# Patient Record
Sex: FEMALE | Race: WHITE | Employment: OTHER | URBAN - METROPOLITAN AREA
[De-identification: names, ages, dates, MRNs, and addresses within clinical notes are randomized per-mention and may not be internally consistent; named-entity substitution may affect disease eponyms.]

---

## 2017-02-22 PROBLEM — Z85.828 PERSONAL HISTORY OF OTHER MALIGNANT NEOPLASM OF SKIN: Status: ACTIVE | Noted: 2017-02-22

## 2017-06-12 ENCOUNTER — HOSPITAL ENCOUNTER (OUTPATIENT)
Dept: MAMMOGRAPHY | Age: 82
Discharge: HOME | End: 2017-06-12
Attending: FAMILY MEDICINE
Payer: MEDICARE

## 2017-06-12 DIAGNOSIS — Z12.31 ENCOUNTER FOR SCREENING MAMMOGRAM FOR BREAST CANCER: ICD-10-CM

## 2017-06-12 PROCEDURE — 77067 SCR MAMMO BI INCL CAD: CPT | Performed by: FAMILY MEDICINE

## 2017-09-19 PROBLEM — Z85.828 PERSONAL HISTORY OF OTHER MALIGNANT NEOPLASM OF SKIN: Status: RESOLVED | Noted: 2017-02-22 | Resolved: 2017-09-19

## 2017-10-30 PROBLEM — H26.493 BILATERAL POSTERIOR CAPSULAR OPACIFICATION: Status: ACTIVE | Noted: 2017-10-30

## 2017-10-30 PROBLEM — Z96.1 PSEUDOPHAKIA OF BOTH EYES: Status: ACTIVE | Noted: 2017-10-30

## 2017-10-30 PROBLEM — H04.123 DRY EYE SYNDROME, BILATERAL: Status: ACTIVE | Noted: 2017-10-30

## 2018-05-29 ENCOUNTER — HOSPITAL ENCOUNTER (OUTPATIENT)
Dept: ULTRASOUND IMAGING | Age: 83
Discharge: HOME | End: 2018-05-29
Attending: INTERNAL MEDICINE
Payer: MEDICARE

## 2018-05-29 DIAGNOSIS — I25.10 CORONARY ARTERIOSCLEROSIS: ICD-10-CM

## 2018-05-29 DIAGNOSIS — R09.89 BRUIT: ICD-10-CM

## 2018-05-29 DIAGNOSIS — R93.1 ELEVATED CORONARY ARTERY CALCIUM SCORE: ICD-10-CM

## 2018-05-29 DIAGNOSIS — R09.89 OTH SYMPTOMS AND SIGNS INVOLVING THE CIRC AND RESP SYSTEMS: ICD-10-CM

## 2018-05-29 DIAGNOSIS — R09.89 BRUIT OF RIGHT CAROTID ARTERY: ICD-10-CM

## 2018-05-29 PROCEDURE — 93880 EXTRACRANIAL BILAT STUDY: CPT | Performed by: INTERNAL MEDICINE

## 2018-08-07 ENCOUNTER — HOSPITAL ENCOUNTER (OUTPATIENT)
Dept: MAMMOGRAPHY | Age: 83
Discharge: HOME | End: 2018-08-07
Attending: FAMILY MEDICINE
Payer: MEDICARE

## 2018-08-07 DIAGNOSIS — Z12.31 SCREENING MAMMOGRAM, ENCOUNTER FOR: ICD-10-CM

## 2018-08-07 PROCEDURE — 77067 SCR MAMMO BI INCL CAD: CPT | Performed by: FAMILY MEDICINE

## 2018-08-07 PROCEDURE — 77063 BREAST TOMOSYNTHESIS BI: CPT | Performed by: FAMILY MEDICINE

## 2019-05-04 ENCOUNTER — HOSPITAL ENCOUNTER (EMERGENCY)
Facility: HOSPITAL | Age: 84
Discharge: HOME | End: 2019-05-04
Attending: EMERGENCY MEDICINE
Payer: MEDICARE

## 2019-05-04 ENCOUNTER — APPOINTMENT (OUTPATIENT)
Dept: CT IMAGING | Facility: HOSPITAL | Age: 84
End: 2019-05-04
Attending: EMERGENCY MEDICINE
Payer: MEDICARE

## 2019-05-04 ENCOUNTER — APPOINTMENT (OUTPATIENT)
Dept: GENERAL RADIOLOGY | Facility: HOSPITAL | Age: 84
End: 2019-05-04
Attending: EMERGENCY MEDICINE
Payer: MEDICARE

## 2019-05-04 VITALS
RESPIRATION RATE: 16 BRPM | WEIGHT: 133 LBS | TEMPERATURE: 98 F | HEART RATE: 61 BPM | HEIGHT: 62 IN | SYSTOLIC BLOOD PRESSURE: 155 MMHG | DIASTOLIC BLOOD PRESSURE: 68 MMHG | BODY MASS INDEX: 24.48 KG/M2 | OXYGEN SATURATION: 95 %
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
## 2019-05-04 PROCEDURE — 99285 EMERGENCY DEPT VISIT HI MDM: CPT | Performed by: EMERGENCY MEDICINE

## 2019-05-04 PROCEDURE — 96360 HYDRATION IV INFUSION INIT: CPT | Performed by: EMERGENCY MEDICINE

## 2019-05-04 PROCEDURE — 73610 X-RAY EXAM OF ANKLE: CPT | Performed by: EMERGENCY MEDICINE

## 2019-05-04 PROCEDURE — 93005 ELECTROCARDIOGRAM TRACING: CPT

## 2019-05-04 PROCEDURE — 81003 URINALYSIS AUTO W/O SCOPE: CPT | Performed by: EMERGENCY MEDICINE

## 2019-05-04 PROCEDURE — 93010 ELECTROCARDIOGRAM REPORT: CPT | Performed by: EMERGENCY MEDICINE

## 2019-05-04 PROCEDURE — 80053 COMPREHEN METABOLIC PANEL: CPT | Performed by: EMERGENCY MEDICINE

## 2019-05-04 PROCEDURE — 84484 ASSAY OF TROPONIN QUANT: CPT | Performed by: EMERGENCY MEDICINE

## 2019-05-04 PROCEDURE — 70450 CT HEAD/BRAIN W/O DYE: CPT | Performed by: EMERGENCY MEDICINE

## 2019-05-04 PROCEDURE — 85025 COMPLETE CBC W/AUTO DIFF WBC: CPT | Performed by: EMERGENCY MEDICINE

## 2019-05-04 NOTE — ED NOTES
Remains alert and oriented,  Asymptomatic,  Family at bedside.    assisted to bathroom, pt unable to provide urine sample

## 2019-05-04 NOTE — ED PROVIDER NOTES
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

## 2019-05-19 PROBLEM — M25.571 ACUTE RIGHT ANKLE PAIN: Status: ACTIVE | Noted: 2019-05-19

## 2019-06-22 ENCOUNTER — HOSPITAL ENCOUNTER (INPATIENT)
Facility: HOSPITAL | Age: 84
LOS: 1 days | Discharge: HOME | DRG: 091 | End: 2019-06-24
Attending: EMERGENCY MEDICINE | Admitting: HOSPITALIST
Payer: MEDICARE

## 2019-06-22 ENCOUNTER — APPOINTMENT (OUTPATIENT)
Dept: CV DIAGNOSTICS | Facility: HOSPITAL | Age: 84
DRG: 091 | End: 2019-06-22
Attending: Other
Payer: MEDICARE

## 2019-06-22 ENCOUNTER — APPOINTMENT (OUTPATIENT)
Dept: MRI IMAGING | Facility: HOSPITAL | Age: 84
DRG: 091 | End: 2019-06-22
Attending: EMERGENCY MEDICINE
Payer: MEDICARE

## 2019-06-22 ENCOUNTER — APPOINTMENT (OUTPATIENT)
Dept: ULTRASOUND IMAGING | Facility: HOSPITAL | Age: 84
DRG: 091 | End: 2019-06-22
Attending: Other
Payer: MEDICARE

## 2019-06-22 DIAGNOSIS — R25.1 OCCASIONAL TREMORS: Primary | ICD-10-CM

## 2019-06-22 PROCEDURE — 82550 ASSAY OF CK (CPK): CPT | Performed by: HOSPITALIST

## 2019-06-22 PROCEDURE — 70553 MRI BRAIN STEM W/O & W/DYE: CPT | Performed by: EMERGENCY MEDICINE

## 2019-06-22 PROCEDURE — 85025 COMPLETE CBC W/AUTO DIFF WBC: CPT | Performed by: EMERGENCY MEDICINE

## 2019-06-22 PROCEDURE — 93880 EXTRACRANIAL BILAT STUDY: CPT | Performed by: OTHER

## 2019-06-22 PROCEDURE — 80053 COMPREHEN METABOLIC PANEL: CPT | Performed by: EMERGENCY MEDICINE

## 2019-06-22 PROCEDURE — 82962 GLUCOSE BLOOD TEST: CPT

## 2019-06-22 PROCEDURE — A9575 INJ GADOTERATE MEGLUMI 0.1ML: HCPCS | Performed by: EMERGENCY MEDICINE

## 2019-06-22 PROCEDURE — 85610 PROTHROMBIN TIME: CPT | Performed by: HOSPITALIST

## 2019-06-22 PROCEDURE — 84439 ASSAY OF FREE THYROXINE: CPT | Performed by: HOSPITALIST

## 2019-06-22 PROCEDURE — 81003 URINALYSIS AUTO W/O SCOPE: CPT | Performed by: EMERGENCY MEDICINE

## 2019-06-22 PROCEDURE — 93306 TTE W/DOPPLER COMPLETE: CPT | Performed by: OTHER

## 2019-06-22 PROCEDURE — 82607 VITAMIN B-12: CPT | Performed by: HOSPITALIST

## 2019-06-22 PROCEDURE — 93010 ELECTROCARDIOGRAM REPORT: CPT

## 2019-06-22 PROCEDURE — 93005 ELECTROCARDIOGRAM TRACING: CPT

## 2019-06-22 PROCEDURE — 99285 EMERGENCY DEPT VISIT HI MDM: CPT

## 2019-06-22 PROCEDURE — 84443 ASSAY THYROID STIM HORMONE: CPT | Performed by: HOSPITALIST

## 2019-06-22 PROCEDURE — 83036 HEMOGLOBIN GLYCOSYLATED A1C: CPT | Performed by: HOSPITALIST

## 2019-06-22 PROCEDURE — 96374 THER/PROPH/DIAG INJ IV PUSH: CPT

## 2019-06-22 PROCEDURE — 92610 EVALUATE SWALLOWING FUNCTION: CPT

## 2019-06-22 RX ORDER — METOCLOPRAMIDE HYDROCHLORIDE 5 MG/ML
5 INJECTION INTRAMUSCULAR; INTRAVENOUS EVERY 8 HOURS PRN
Status: DISCONTINUED | OUTPATIENT
Start: 2019-06-22 | End: 2019-06-24

## 2019-06-22 RX ORDER — METOPROLOL TARTRATE 50 MG/1
50 TABLET, FILM COATED ORAL
Status: DISCONTINUED | OUTPATIENT
Start: 2019-06-22 | End: 2019-06-24

## 2019-06-22 RX ORDER — GABAPENTIN 100 MG/1
100 CAPSULE ORAL NIGHTLY
Status: DISCONTINUED | OUTPATIENT
Start: 2019-06-22 | End: 2019-06-24

## 2019-06-22 RX ORDER — SODIUM CHLORIDE 9 MG/ML
INJECTION, SOLUTION INTRAVENOUS CONTINUOUS
Status: DISCONTINUED | OUTPATIENT
Start: 2019-06-22 | End: 2019-06-24

## 2019-06-22 RX ORDER — GABAPENTIN 100 MG/1
CAPSULE ORAL
Qty: 180 CAPSULE | Refills: 0 | Status: SHIPPED | OUTPATIENT
Start: 2019-06-22 | End: 2019-06-24

## 2019-06-22 RX ORDER — MORPHINE SULFATE 4 MG/ML
2 INJECTION, SOLUTION INTRAMUSCULAR; INTRAVENOUS EVERY 2 HOUR PRN
Status: DISCONTINUED | OUTPATIENT
Start: 2019-06-22 | End: 2019-06-24

## 2019-06-22 RX ORDER — ONDANSETRON 2 MG/ML
4 INJECTION INTRAMUSCULAR; INTRAVENOUS EVERY 6 HOURS PRN
Status: DISCONTINUED | OUTPATIENT
Start: 2019-06-22 | End: 2019-06-24

## 2019-06-22 RX ORDER — ATORVASTATIN CALCIUM 80 MG/1
80 TABLET, FILM COATED ORAL NIGHTLY
Status: DISCONTINUED | OUTPATIENT
Start: 2019-06-22 | End: 2019-06-24

## 2019-06-22 RX ORDER — ACETAMINOPHEN 650 MG/1
650 SUPPOSITORY RECTAL EVERY 4 HOURS PRN
Status: DISCONTINUED | OUTPATIENT
Start: 2019-06-22 | End: 2019-06-24

## 2019-06-22 RX ORDER — RAMIPRIL 5 MG/1
10 CAPSULE ORAL DAILY
Status: DISCONTINUED | OUTPATIENT
Start: 2019-06-23 | End: 2019-06-24

## 2019-06-22 RX ORDER — ACETAMINOPHEN 325 MG/1
650 TABLET ORAL EVERY 4 HOURS PRN
Status: DISCONTINUED | OUTPATIENT
Start: 2019-06-22 | End: 2019-06-24

## 2019-06-22 RX ORDER — SENNOSIDES 8.6 MG
17.2 TABLET ORAL NIGHTLY
Status: DISCONTINUED | OUTPATIENT
Start: 2019-06-22 | End: 2019-06-24

## 2019-06-22 RX ORDER — MORPHINE SULFATE 4 MG/ML
1 INJECTION, SOLUTION INTRAMUSCULAR; INTRAVENOUS EVERY 2 HOUR PRN
Status: DISCONTINUED | OUTPATIENT
Start: 2019-06-22 | End: 2019-06-24

## 2019-06-22 RX ORDER — ASPIRIN 325 MG
325 TABLET ORAL DAILY
Status: DISCONTINUED | OUTPATIENT
Start: 2019-06-22 | End: 2019-06-24

## 2019-06-22 RX ORDER — PHENYLEPHRINE HCL IN 0.9% NACL 50MG/250ML
PLASTIC BAG, INJECTION (ML) INTRAVENOUS CONTINUOUS PRN
Status: DISCONTINUED | OUTPATIENT
Start: 2019-06-22 | End: 2019-06-24

## 2019-06-22 RX ORDER — LABETALOL HYDROCHLORIDE 5 MG/ML
10 INJECTION, SOLUTION INTRAVENOUS EVERY 10 MIN PRN
Status: DISCONTINUED | OUTPATIENT
Start: 2019-06-22 | End: 2019-06-24

## 2019-06-22 RX ORDER — HYDROCHLOROTHIAZIDE 25 MG/1
6.25 TABLET ORAL DAILY
Status: DISCONTINUED | OUTPATIENT
Start: 2019-06-22 | End: 2019-06-24

## 2019-06-22 RX ORDER — ALPRAZOLAM 0.25 MG/1
0.12 TABLET ORAL 3 TIMES DAILY PRN
Status: DISCONTINUED | OUTPATIENT
Start: 2019-06-22 | End: 2019-06-24

## 2019-06-22 RX ORDER — ATORVASTATIN CALCIUM 20 MG/1
20 TABLET, FILM COATED ORAL NIGHTLY
Status: DISCONTINUED | OUTPATIENT
Start: 2019-06-22 | End: 2019-06-22

## 2019-06-22 RX ORDER — BISOPROLOL FUMARATE AND HYDROCHLOROTHIAZIDE 2.5; 6.25 MG/1; MG/1
1 TABLET ORAL
Status: DISCONTINUED | OUTPATIENT
Start: 2019-06-23 | End: 2019-06-22 | Stop reason: SDUPTHER

## 2019-06-22 RX ORDER — LORAZEPAM 2 MG/ML
0.5 INJECTION INTRAMUSCULAR ONCE
Status: COMPLETED | OUTPATIENT
Start: 2019-06-22 | End: 2019-06-22

## 2019-06-22 RX ORDER — GABAPENTIN 100 MG/1
100 CAPSULE ORAL 3 TIMES DAILY
Status: DISCONTINUED | OUTPATIENT
Start: 2019-06-22 | End: 2019-06-22

## 2019-06-22 NOTE — ED NOTES
Rounded on pt. Back from MRI. Updated on POC. Not in any distress. Resting on cart with family at bedside.

## 2019-06-22 NOTE — PROGRESS NOTES
[facility] Pharmacy Note:  Renal Dose Adjustment for Metoclopramide (REGLAN)    Salima Masterson has been prescribed Metoclopramide (REGLAN) 10 mg every 8 hours as needed for N/V. Estimated Creatinine Clearance: 24.7 mL/min (A) (based on SCr of 1.13 mg/dL (H)).

## 2019-06-22 NOTE — ED INITIAL ASSESSMENT (HPI)
Collapsing x2 last night. Hx previous fall causing chronic tremors to right leg. Tremors cause pts leg to give out and she collapses. Uses walker but still collapses when tremor starts. Has not hit head.  Has appt with neurologist in august

## 2019-06-22 NOTE — SLP NOTE
ADULT SWALLOWING EVALUATION    ASSESSMENT    ASSESSMENT/OVERALL IMPRESSION:  Brief Background Information: Per MD note, Corine Yoder is 79 y/o woman who was admitted due to frequent right leg tremor and falls.  Her daughter, son in law and  are on the bed outlined below and posted HOB; medication to be administered one pill at a time. This dept to follow closely to assess tolerance of recommended diet and assess cog-comm skills per stroke protocol.         RECOMMENDATIONS   Diet Recommendations - Solids: Re matter. Critical Findings were discussed with Dr. Macie Salinas 1140 hr on 6/22/2019 with read back. Dictated by: Cony Palma MD on 6/22/2019 at 11:35         SUBJECTIVE   Spouse of 66 years present for this evaluation.     OBJECTIVE   ORAL MOTOR EXAMINATIO

## 2019-06-22 NOTE — ED PROVIDER NOTES
Patient Seen in: [facility] Emergency Department    History   Patient presents with:  Fall (musculoskeletal, neurologic)    Stated Complaint: FALL    HPI    80-year-old female comes to the emergency department for evaluation after intermittent episo Francisco Fontana.  Ankur ([facility]); no adjuvant tx, right   •       x 2   • PERIPHERAL VASCULAR SCREENING HISTORICAL CONV  2012    PAD screen, mild carotid disease   • PERIPHERAL VASCULAR SCREENING HISTORICAL CONV Bilateral 10/31/2017    mild caroti including finger-nose and heel shin was normal.      ED Course     Labs Reviewed   COMP METABOLIC PANEL (14) - Abnormal; Notable for the following components:       Result Value    BUN 26 (*)     Creatinine 1.13 (*)     BUN/CREA Ratio 23.0 (*)     Calculat MD on 6/22/2019 at 11:35     Approved by: Alayna Carmichael MD            The patient remained neurologically intact and stable throughout the course the emergency department stay.   Case was discussed with the [facility] hospitalist as well as [facility] neurology Dr. Angelica Weller

## 2019-06-22 NOTE — H&P
[facility] hospitalist H+P  PCP Von Bacon MD  CC falls  HPI 79 yo female with multiple medical problems including but not limited to HTN, breast cancer, HL presents for evaluation of falls. Per pt she has been falling several time per day since may.  Per fami DENSITY AXIAL (CPT=77080)  11/14/2007    maurilio     Family History   Problem Relation Age of Onset   • Breast Cancer Sister 54   • Dementia Mother    • Other (Other) Mother         Dementia, arthritis   • Suicide History Father    • Depression Father    • Hobby Hazards: No        Sleep Concern: No        Stress Concern: No        Weight Concern: No        Special Diet: No        Back Care: No        Exercise: Yes          walk        Bike Helmet: No        Seat Belt: Yes        Self-Exams: Yes    Social His WBC 9.8   .0     Ua negative nitrite, negative leukocytes    BUN 26  Creatinine 1.13  Na 141    AST 19. ALT 19    MRI brain    Personally reviewed result in Epic  CONCLUSION:       1.  Possible tiny acute lacunar-type infarct involving the subcorti

## 2019-06-22 NOTE — PLAN OF CARE
BSE completed this date; diet recommended to be initiated; aspiration precautions in place and posted HOB; cognitive-comm eval pending per stroke protocol.

## 2019-06-22 NOTE — CONSULTS
Neurology consult NOTE    The reason for consultation:  Right leg tremor, intermittent and falls. HPI:   Zayra Goff is 79 y/o woman who was admitted due to frequent right leg tremor and falls. Her daughter, son in law and  are on the bed sides.    On • [address]  2008    Dr. Yanet Bolaños.  Ankur ([facility]); no adjuvant tx, right   •       x 2   • PERIPHERAL VASCULAR SCREENING HISTORICAL CONV  2012    PAD screen, mild carotid disease   • PERIPHERAL VASCULAR SCREEN Attends meetings of clubs or organizations: Not on file        Relationship status: Not on file      Intimate partner violence:        Fear of current or ex partner: Not on file        Emotionally abused: Not on file        Physically abused: Not on clyde Hearing: NL     Palate: NL     SCM/Trapezius: 5/5     Tongue in the middle    MOTOR FUNCTION:     Tone: NL     Bulk: NL     Strength: NL     Abnormal Movement: No tremor noticed     SENSORY FUNCTION:     Intact to lt   CEREBELLUM:     Upper Extremities: fi presented with a month hx of right leg episodic tremor, and falls. DDX including not limiting orthostatic tremor, simple partial seizure, and resting tremor. Her neuro exam showed no evidence of tremor.      She has incidental findings of right frontal lobe

## 2019-06-23 PROCEDURE — 97162 PT EVAL MOD COMPLEX 30 MIN: CPT

## 2019-06-23 PROCEDURE — 97530 THERAPEUTIC ACTIVITIES: CPT

## 2019-06-23 PROCEDURE — 82962 GLUCOSE BLOOD TEST: CPT

## 2019-06-23 PROCEDURE — 80061 LIPID PANEL: CPT | Performed by: HOSPITALIST

## 2019-06-23 PROCEDURE — 80048 BASIC METABOLIC PNL TOTAL CA: CPT | Performed by: HOSPITALIST

## 2019-06-23 PROCEDURE — 97116 GAIT TRAINING THERAPY: CPT

## 2019-06-23 PROCEDURE — 92526 ORAL FUNCTION THERAPY: CPT

## 2019-06-23 RX ORDER — HEPARIN SODIUM 5000 [USP'U]/ML
5000 INJECTION, SOLUTION INTRAVENOUS; SUBCUTANEOUS EVERY 12 HOURS SCHEDULED
Status: DISCONTINUED | OUTPATIENT
Start: 2019-06-23 | End: 2019-06-24

## 2019-06-23 RX ORDER — FAMOTIDINE 20 MG/1
20 TABLET ORAL DAILY
Status: DISCONTINUED | OUTPATIENT
Start: 2019-06-23 | End: 2019-06-24

## 2019-06-23 RX ORDER — FAMOTIDINE 10 MG/ML
20 INJECTION, SOLUTION INTRAVENOUS DAILY
Status: DISCONTINUED | OUTPATIENT
Start: 2019-06-23 | End: 2019-06-24

## 2019-06-23 NOTE — SLP NOTE
SPEECH DAILY NOTE - INPATIENT    ASSESSMENT & PLAN   ASSESSMENT  Pt seen for meal assess/dysphagia therapy to monitor po tolerance of recommended diet and ensure adherence to aspiration precautions. Pt alert and participatory.   Pt observed consuming lunch M.S.,[facility]-SLP  Speech Language Pathologist

## 2019-06-23 NOTE — PLAN OF CARE
Problem: Impaired Swallowing  Goal: Minimize aspiration risk  Description  Interventions:  - Patient should be alert and upright for all feedings (90 degrees preferred)  - Offer food and liquids at a slow rate  - Encourage small bites of food and small s

## 2019-06-23 NOTE — PLAN OF CARE
Assumed care at [facility]. Pt a/ox4. Neuro wnl. VSS. NSR/SB per tele. RA. Denies pain. IVF infusing per stroke protocol. Pt updated w/ POC. Echo and carotid US, results pending. Will continue to monitor.

## 2019-06-23 NOTE — PROGRESS NOTES
[facility] hospitalist daily note  Patient was seen/examined on 6/23/19    S: no headache, no chest pain, no SOB, no nausea/emesis, no dizziness  No numbness/tingling  Had echo    Medications in Epic    PE    06/23/19  1300   BP: 128/59   Pulse: 60   Resp: 18   T has LBBB  Per neuro start gabapentin  -echo with no regional wall motion abnormality  -evaluated by speech, consult PT     HTN monitor BP  Medications ordered    Abnormal EKG, LBBB, CVA; consult cardiology     Hyperlipidemia; statin     Hx of breast ca; fo

## 2019-06-23 NOTE — PROGRESS NOTES
06/23/19 1309   Clinical Encounter Type   Visited With Patient and family together   Routine Visit Introduction   Continue Visiting No   Restorationist Encounters   Restorationist Needs Prayer  ( prayed for the patient and the family's healyh and the fiut

## 2019-06-23 NOTE — PROGRESS NOTES
Neurology follow up NOTE    SUBJECTIVE:  She is upset by knowing she cannot go home due to her gait problems. Her family members were at bed side. She has no right leg tremor in past 24 hrs.      OBJECTIVE:   /53 (BP Location: Left arm)   Pulse 66   T LLE: 2+/4     PLANTAR RESPONSE: down going toes bilaterally. Mri Brain (w+wo) (cpt=70553)    Result Date: 6/22/2019  CONCLUSION:   1. Possible tiny acute lacunar-type infarct involving the subcortical white matter of the right frontal lobe.   2. Mode

## 2019-06-23 NOTE — PHYSICAL THERAPY NOTE
PHYSICAL THERAPY EVALUATION - INPATIENT     Room Number: 2451/9121-B  Evaluation Date: 6/23/2019  Type of Evaluation: Initial  Physician Order: PT Eval and Treat    Presenting Problem: falls, R LE tremors  Reason for Therapy: Mobility Dysfunction and microcalcifications and a left upper pole nodule measuring 2.9 cm.    • Other and unspecified hyperlipidemia 9/13/2011   • Skin cancer of nose 2003       Past Surgical History  Past Surgical History:   Procedure Laterality Date   • CATARACT  2008    right +  Static Standing: Fair -(used RW)  Dynamic Standing: Fair -(used RW)    ADDITIONAL TESTS                                    NEUROLOGICAL FINDINGS   WFL heel-to shin                   ACTIVITY TOLERANCE   BP sitting at EOB L arm: 135/47         BP: 126/45 which pt agreed to, pt was able to take steps to the recliner chair with the RW cga; pt was observed rushing to the chair & was also rushing to sit down, pt was given verbal cues to slow down. Pt is very fearful of falling & needed to be reassured.      Ex prior to level of function. DISCHARGE RECOMMENDATIONS  PT Discharge Recommendations: Sub-acute rehabilitation    PLAN  PT Treatment Plan: Bed mobility; Patient education; Family education;Gait training;Range of motion;Strengthening;Transfer training  Rehab

## 2019-06-23 NOTE — PROGRESS NOTES
Assumed pt care at 0730  Pt a & o x 4 vss on room air no complaints of pain   Neuro checks q4h  NIH 0   Cardiology consult Dr Jacy Martinez notified will see pt in the am 6/24  Per Dr. Marla boss to start the patient on subq heparin 5000 units BID   Orthostatic vit

## 2019-06-24 VITALS
WEIGHT: 125 LBS | HEART RATE: 72 BPM | RESPIRATION RATE: 24 BRPM | DIASTOLIC BLOOD PRESSURE: 63 MMHG | TEMPERATURE: 98 F | BODY MASS INDEX: 24.54 KG/M2 | SYSTOLIC BLOOD PRESSURE: 151 MMHG | OXYGEN SATURATION: 97 % | HEIGHT: 60 IN

## 2019-06-24 PROCEDURE — 95816 EEG AWAKE AND DROWSY: CPT

## 2019-06-24 PROCEDURE — 97165 OT EVAL LOW COMPLEX 30 MIN: CPT

## 2019-06-24 PROCEDURE — 82962 GLUCOSE BLOOD TEST: CPT

## 2019-06-24 PROCEDURE — 97530 THERAPEUTIC ACTIVITIES: CPT

## 2019-06-24 PROCEDURE — 97116 GAIT TRAINING THERAPY: CPT

## 2019-06-24 RX ORDER — ASPIRIN 325 MG
325 TABLET ORAL DAILY
Qty: 30 TABLET | Refills: 0 | Status: SHIPPED | OUTPATIENT
Start: 2019-06-25

## 2019-06-24 RX ORDER — GABAPENTIN 100 MG/1
CAPSULE ORAL
Qty: 180 CAPSULE | Refills: 0 | Status: SHIPPED | OUTPATIENT
Start: 2019-06-24 | End: 2019-08-02

## 2019-06-24 NOTE — PROCEDURES
Clinical History: 80year old female with right leg tremor     EEG DESCRIPTION    AWAKE  Background: 8 Hz; posterior head regions, symmetric, waxing and waning, reactive to eye opening and closure  Beta: 15-25 Hz; frontocentral, symmetric, waxing and wanin

## 2019-06-24 NOTE — DISCHARGE SUMMARY
General Medicine Discharge Summary     Patient ID:  Millicent Gomez  80year old  1/5/1931    Admit date: 6/22/2019    Discharge date and time:6/24/2019    Attending Physician: Obed Caceres MD CARDIOLOGY    Operative Procedures:        Patient instructions:      Current Discharge Medication List    START taking these medications    aspirin 325 MG Oral Tab  Take 1 tablet (325 mg total) by mouth daily.     gabapentin 100 MG Oral Cap  Gabapentin 100

## 2019-06-24 NOTE — CONSULTS
[facility] Cardiology Consultation    Davidfm Emely Patient Status:  Inpatient    1931 MRN [id number]   [facility] 7NE-A Attending Yony Nagel MD   Hosp Day # 1 PCP April Guerrero MD     Reason for Consultation:  LBBB, CAD      Hist nose tip   • SKIN SURGERY  2/8/17    Exc of BCC to left upper medial back with [facility]   • XR DEXA BONE DENSITY AXIAL (CPT=77080)  11/14/2007    maurilio     Family History   Problem Relation Age of Onset   • Breast Cancer Sister 54   • Dementia Mother    • Other °C)  Pulse: 59  Resp: 18  BP: 151/55      General:  Appears comfortable  HEENT: No focal deficits. Neck: No JVD, carotids 2+ no bruits. Cardiac: Regular S1S2. No S3, S4, rub, click. No murmur. Lungs: Clear to auscultation and percussion.   Abdomen: Sof that this reflects a cardiomyopathic process such as LV dysfunction. 3. Atherosclerotic heart disease, as evidenced by the presence of coronary calcium. Total score was 819 on a scan done October 2, 2017, currently asymptomatic.      4. Hypertension , w

## 2019-06-24 NOTE — PLAN OF CARE
Assumed care of patient at 07:30am.  Patient A/0 x4, A/O improved from last night and early this morning. Patient's VSS, NSR per tele, RA  PT/OT gave patient and  outpatient PT info. EEG completed and WNL. Stroke education completed.   Plan to pressure  - Maintain blood pressure and fluid volume within ordered parameters to optimize cerebral perfusion and minimize risk of hemorrhage  - Monitor temperature, glucose, and sodium.  Initiate appropriate interventions as ordered  - Educate patient to r

## 2019-06-24 NOTE — PHYSICAL THERAPY NOTE
PHYSICAL THERAPY TREATMENT NOTE - INPATIENT    Room Number: 6439/8473-H     Session: 1   Number of Visits to Meet Established Goals: 3    Presenting Problem: falls, R LE tremors     Per Neuro, differential diagnoses includes essential tremor, orthostatic forgetful    Patient’s self-stated goal is gom home    OBJECTIVE       WEIGHT BEARING RESTRICTION  Weight Bearing Restriction: None                PAIN ASSESSMENT   Ratin          BALANCE addressed; Family present    ASSESSMENT   Patient is a 80year old female admitted on 6/22/2019 for frequent falls, R LE tremors. Pertinent comorbidities and personal factors impacting therapy include h/o frequent falls. Pt progressing to supervision level f

## 2019-06-24 NOTE — PROGRESS NOTES
Subjective     No overnight events    • famoTIDine  20 mg Oral Daily    Or   • famoTIDine  20 mg Intravenous Daily   • Heparin Sodium (Porcine)  5,000 Units Subcutaneous 2 times per day   • aspirin  325 mg Oral Daily   • gabapentin  100 mg Oral Nightly   •

## 2019-06-24 NOTE — PROGRESS NOTES
NURSING DISCHARGE NOTE    Discharge instructions given and reviewed with patient, daughter and . All questions answered, verbalized understanding. Stroke education completed.  Stroke signs and symptoms reviewed,  Patient and  able to stat

## 2019-06-24 NOTE — PLAN OF CARE
Assumed care at [address]. Denies pain/discomfort. Stroke protocol. No deficits noted. Forgetful at times.   Plan: EEG 6/24      Problem: Impaired Swallowing  Goal: Minimize aspiration risk  Description  Interventions:  - Patient should be alert and upright

## 2019-06-25 NOTE — CM/SW NOTE
06/25/19 0900   Discharge disposition   Expected discharge disposition Home or Self   Name of [address] Provider Home   Outpatient services Physical therapy   Home services after discharge None   Dis

## 2019-08-13 ENCOUNTER — HOSPITAL ENCOUNTER (OUTPATIENT)
Dept: MAMMOGRAPHY | Age: 84
Discharge: HOME | End: 2019-08-13
Attending: FAMILY MEDICINE
Payer: MEDICARE

## 2019-08-13 DIAGNOSIS — Z12.31 ENCOUNTER FOR SCREENING MAMMOGRAM FOR BREAST CANCER: ICD-10-CM

## 2019-08-13 PROCEDURE — 77063 BREAST TOMOSYNTHESIS BI: CPT | Performed by: FAMILY MEDICINE

## 2019-08-13 PROCEDURE — 77067 SCR MAMMO BI INCL CAD: CPT | Performed by: FAMILY MEDICINE

## 2019-08-27 PROBLEM — M54.50 CHRONIC LOW BACK PAIN WITHOUT SCIATICA: Status: ACTIVE | Noted: 2019-08-27

## 2019-08-27 PROBLEM — G89.29 CHRONIC LOW BACK PAIN WITHOUT SCIATICA: Status: ACTIVE | Noted: 2019-08-27

## 2020-03-03 PROBLEM — N18.30 CKD (CHRONIC KIDNEY DISEASE) STAGE 3, GFR 30-59 ML/MIN (HCC): Status: ACTIVE | Noted: 2020-03-03

## 2020-08-18 ENCOUNTER — HOSPITAL ENCOUNTER (OUTPATIENT)
Dept: MAMMOGRAPHY | Age: 85
Discharge: HOME | End: 2020-08-18
Attending: FAMILY MEDICINE
Payer: MEDICARE

## 2020-08-18 DIAGNOSIS — Z85.3 HISTORY OF RIGHT BREAST CANCER: ICD-10-CM

## 2020-08-18 DIAGNOSIS — Z12.31 SCREENING MAMMOGRAM, ENCOUNTER FOR: ICD-10-CM

## 2020-08-18 PROCEDURE — 77063 BREAST TOMOSYNTHESIS BI: CPT | Performed by: FAMILY MEDICINE

## 2020-08-18 PROCEDURE — 77067 SCR MAMMO BI INCL CAD: CPT | Performed by: FAMILY MEDICINE

## 2021-01-07 PROBLEM — H61.23 IMPACTED CERUMEN, BILATERAL: Status: ACTIVE | Noted: 2021-01-07

## 2021-04-06 PROBLEM — F03.90 DEMENTIA WITHOUT BEHAVIORAL DISTURBANCE, UNSPECIFIED DEMENTIA TYPE: Status: ACTIVE | Noted: 2021-04-06

## 2021-04-06 PROBLEM — F03.90 DEMENTIA WITHOUT BEHAVIORAL DISTURBANCE, UNSPECIFIED DEMENTIA TYPE (HCC): Status: ACTIVE | Noted: 2021-04-06

## 2021-08-10 PROBLEM — L85.3 XEROSIS CUTIS: Status: ACTIVE | Noted: 2021-08-10

## 2021-08-10 PROBLEM — F03.90 DEMENTIA WITHOUT BEHAVIORAL DISTURBANCE, UNSPECIFIED DEMENTIA TYPE (HCC): Status: RESOLVED | Noted: 2021-04-06 | Resolved: 2021-08-10

## 2021-08-10 PROBLEM — M25.571 ACUTE RIGHT ANKLE PAIN: Status: RESOLVED | Noted: 2019-05-19 | Resolved: 2021-08-10

## 2021-08-10 PROBLEM — E78.00 PURE HYPERCHOLESTEROLEMIA: Status: ACTIVE | Noted: 2021-08-10

## 2021-08-10 PROBLEM — F03.90 DEMENTIA WITHOUT BEHAVIORAL DISTURBANCE, UNSPECIFIED DEMENTIA TYPE: Status: RESOLVED | Noted: 2021-04-06 | Resolved: 2021-08-10

## 2021-08-10 PROBLEM — R73.02 GLUCOSE INTOLERANCE (IMPAIRED GLUCOSE TOLERANCE): Status: ACTIVE | Noted: 2021-08-10

## 2021-08-10 PROBLEM — H61.23 IMPACTED CERUMEN, BILATERAL: Status: RESOLVED | Noted: 2021-01-07 | Resolved: 2021-08-10

## 2021-08-10 PROBLEM — F01.50 VASCULAR DEMENTIA WITHOUT BEHAVIORAL DISTURBANCE (HCC): Status: ACTIVE | Noted: 2021-08-10

## 2022-08-17 NOTE — ED NOTES
Patient is remained calm and cooperative throughout shift, awaiting transfer and morning. Endorsed to my colleague at shift change. Vital signs stable.

## 2022-08-17 NOTE — ED NOTES
Called [facility] to make referral for Pt. No answer. Left VM. Will try again later tonight if no call back.

## 2022-08-17 NOTE — ED NOTES
This writer contacted [address] [place] [address] regarding status of patients referral packet. No appropriate program for patient. No reason provided.

## 2022-08-17 NOTE — ED NOTES
This writer contacted patients Daughter, to provide an update on plan of care. This writer notified daughter that [facility] does have any beds. She was notified that [facility] has an available bed for patient. Patients daughter is agreeable to transfer out. This writer will follow up to confirm power of  healthcare paperwork vs. Living will is on file with our facility.

## 2022-08-17 NOTE — ED NOTES
This writer contacted patients daughter, who does not any other form than living will. Patients daughter cannot find any other paperwork than the living will at this point. Patients  is presently at a three hour infusion appointment and unavailable via phone at the time. Patients daughter will attempt to have a family member with him call with patient, once he is available. This writer notified Manuel; who will follow up with this writer. This writer spoke with Vero Gonzalez, who will have to follow up with this writer.

## 2022-08-17 NOTE — ED NOTES
This writer met with patients daughter regarding plan of care. She provided a copy of patients power of  for healthcare paperwork. She was informed that patient was accepted to [facility] and will be transferred today. Patients daughter is agreeable to transfer. Nursing and [facility] notified. A copy was scanned into patients chart.

## 2022-08-17 NOTE — ED NOTES
This writer spoke with Saym of [facility]. They confirmed receiving Living will in patients referral packet. She needs POA paperwork to move forward with acceptance. They will complete nurse to nurse for patient once this is received. This writer notified Shelly Pemberton.

## 2022-08-17 NOTE — ED NOTES
This writer spoke with patients . He is Power of  for patient. Patients father givens permission for this daughter to discuss plan of care. This writer notified patients  that [facility] has an available bed but is requesting POA paperwork. This writer notified patients  that living will was submitted on file. Patients  was notified that [employer] [facility] will need a copy of POA paperwork.

## 2022-08-17 NOTE — ED NOTES
Patient accepted to [facility].  Dr. Hallie Larios is accepting. Transportation will be set up during nurse to nurse.

## 2022-08-17 NOTE — ED NOTES
This writer contacted [address] regarding status of patients referral.  This writer spoke with [employer]. Patients packet is under review.

## 2022-08-17 NOTE — ED NOTES
TRANSFER SUMMARY:  [facility] Maldonado: Faxed packet for review. NO AVAILABLE BEDS:  [facility]: No available beds with no scheduled discharges today. [facility] CARLOS: No beds available d/t patient requiring a 1:1.     PATIENT DEFLECTED/NO ANSWER:  [facility]:  Patient deflected d/t no appropriate program.  OUT OF NETWORK:   Betty Millan

## 2022-08-17 NOTE — ED NOTES
This writer contacted patients daughter, Maryellen Miner, who is power of  for patient. This writer left a voicemail message for a return phone call.

## 2022-08-17 NOTE — ED NOTES
This worker was informed by [employer] that [employer] is OON with [facility] so she will need to be transferred out. This writer called Pt's daughter/POA, Jacques Ozuna, and explained the need for transfer out and offered a list of hospitals we can make referrals to. Jacques Ozuna prefers Pt only be referred to [facility] for tonight and will look into other hospitals tomorrow if Pt is not placed tonight.

## 2022-08-17 NOTE — ED NOTES
Spoke with Feliberto Colunga from Intake Department at [facility] who was unable to take referral tonight, recommended calling back in AM to make referral.

## 2022-08-17 NOTE — ED NOTES
This writer received an incoming phone call from patients daughter. She will bring in patients Power of  paperwork to [facility].    This writer notified [employer].

## 2022-08-17 NOTE — ED NOTES
Patient taken over from previous physician pending mental health transfer location to be determined.

## 2022-10-04 ENCOUNTER — APPOINTMENT (OUTPATIENT)
Dept: CV DIAGNOSTICS | Facility: HOSPITAL | Age: 87
End: 2022-10-04
Attending: INTERNAL MEDICINE
Payer: MEDICARE

## 2022-10-04 ENCOUNTER — APPOINTMENT (OUTPATIENT)
Dept: CT IMAGING | Facility: HOSPITAL | Age: 87
End: 2022-10-04
Attending: EMERGENCY MEDICINE
Payer: MEDICARE

## 2022-10-04 ENCOUNTER — APPOINTMENT (OUTPATIENT)
Dept: GENERAL RADIOLOGY | Facility: HOSPITAL | Age: 87
End: 2022-10-04
Attending: EMERGENCY MEDICINE
Payer: MEDICARE

## 2022-10-04 ENCOUNTER — HOSPITAL ENCOUNTER (INPATIENT)
Facility: HOSPITAL | Age: 87
LOS: 2 days | Discharge: NURSING, REHAB OR LONG-TERM CARE | End: 2022-10-06
Attending: EMERGENCY MEDICINE | Admitting: INTERNAL MEDICINE
Payer: MEDICARE

## 2022-10-04 DIAGNOSIS — I26.09 OTHER ACUTE PULMONARY EMBOLISM WITH ACUTE COR PULMONALE (HCC): Primary | ICD-10-CM

## 2022-10-04 DIAGNOSIS — J96.01 ACUTE RESPIRATORY FAILURE WITH HYPOXIA (HCC): ICD-10-CM

## 2022-10-04 DIAGNOSIS — R77.8 ELEVATED TROPONIN: ICD-10-CM

## 2022-10-04 PROBLEM — I26.99 PULMONARY EMBOLUS (HCC): Status: ACTIVE | Noted: 2022-10-04

## 2022-10-04 LAB
ALBUMIN SERPL-MCNC: 2.5 G/DL (ref 3.4–5)
ALBUMIN/GLOB SERPL: 0.6 {RATIO} (ref 1–2)
ALP LIVER SERPL-CCNC: 155 U/L
ALT SERPL-CCNC: 22 U/L
ANION GAP SERPL CALC-SCNC: 9 MMOL/L (ref 0–18)
APTT PPP: 112.2 SECONDS (ref 23.3–35.6)
APTT PPP: 27.1 SECONDS (ref 23.3–35.6)
AST SERPL-CCNC: 23 U/L (ref 15–37)
BASOPHILS # BLD AUTO: 0.02 X10(3) UL (ref 0–0.2)
BASOPHILS NFR BLD AUTO: 0.1 %
BILIRUB SERPL-MCNC: 0.4 MG/DL (ref 0.1–2)
BILIRUB UR QL STRIP.AUTO: NEGATIVE
BUN BLD-MCNC: 16 MG/DL (ref 7–18)
CALCIUM BLD-MCNC: 9.2 MG/DL (ref 8.5–10.1)
CHLORIDE SERPL-SCNC: 104 MMOL/L (ref 98–112)
CHOLEST SERPL-MCNC: 232 MG/DL (ref ?–200)
CLARITY UR REFRACT.AUTO: CLEAR
CO2 SERPL-SCNC: 23 MMOL/L (ref 21–32)
COLOR UR AUTO: COLORLESS
CREAT BLD-MCNC: 1.02 MG/DL
D DIMER PPP FEU-MCNC: 4.78 UG/ML FEU (ref ?–0.91)
EOSINOPHIL # BLD AUTO: 0.02 X10(3) UL (ref 0–0.7)
EOSINOPHIL NFR BLD AUTO: 0.1 %
ERYTHROCYTE [DISTWIDTH] IN BLOOD BY AUTOMATED COUNT: 14.6 %
GFR SERPLBLD BASED ON 1.73 SQ M-ARVRAT: 52 ML/MIN/1.73M2 (ref 60–?)
GLOBULIN PLAS-MCNC: 4.4 G/DL (ref 2.8–4.4)
GLUCOSE BLD-MCNC: 153 MG/DL (ref 70–99)
GLUCOSE UR STRIP.AUTO-MCNC: NEGATIVE MG/DL
HCT VFR BLD AUTO: 40.9 %
HDLC SERPL-MCNC: 43 MG/DL (ref 40–59)
HGB BLD-MCNC: 12.9 G/DL
IMM GRANULOCYTES # BLD AUTO: 0.06 X10(3) UL (ref 0–1)
IMM GRANULOCYTES NFR BLD: 0.4 %
INR BLD: 1.12 (ref 0.85–1.16)
KETONES UR STRIP.AUTO-MCNC: NEGATIVE MG/DL
LDLC SERPL CALC-MCNC: 157 MG/DL (ref ?–100)
LEUKOCYTE ESTERASE UR QL STRIP.AUTO: NEGATIVE
LYMPHOCYTES # BLD AUTO: 0.71 X10(3) UL (ref 1–4)
LYMPHOCYTES NFR BLD AUTO: 4.8 %
MCH RBC QN AUTO: 28.9 PG (ref 26–34)
MCHC RBC AUTO-ENTMCNC: 31.5 G/DL (ref 31–37)
MCV RBC AUTO: 91.7 FL
MONOCYTES # BLD AUTO: 0.95 X10(3) UL (ref 0.1–1)
MONOCYTES NFR BLD AUTO: 6.4 %
NEUTROPHILS # BLD AUTO: 13 X10 (3) UL (ref 1.5–7.7)
NEUTROPHILS # BLD AUTO: 13 X10(3) UL (ref 1.5–7.7)
NEUTROPHILS NFR BLD AUTO: 88.2 %
NITRITE UR QL STRIP.AUTO: NEGATIVE
NONHDLC SERPL-MCNC: 189 MG/DL (ref ?–130)
NT-PROBNP SERPL-MCNC: 4017 PG/ML (ref ?–450)
OSMOLALITY SERPL CALC.SUM OF ELEC: 286 MOSM/KG (ref 275–295)
PH UR STRIP.AUTO: 6 [PH] (ref 5–8)
PLATELET # BLD AUTO: 360 10(3)UL (ref 150–450)
POTASSIUM SERPL-SCNC: 4.7 MMOL/L (ref 3.5–5.1)
PROCALCITONIN SERPL-MCNC: 0.08 NG/ML (ref ?–0.16)
PROT SERPL-MCNC: 6.9 G/DL (ref 6.4–8.2)
PROT UR STRIP.AUTO-MCNC: NEGATIVE MG/DL
PROTHROMBIN TIME: 14.4 SECONDS (ref 11.6–14.8)
RBC # BLD AUTO: 4.46 X10(6)UL
RBC UR QL AUTO: NEGATIVE
SARS-COV-2 RNA RESP QL NAA+PROBE: NOT DETECTED
SODIUM SERPL-SCNC: 136 MMOL/L (ref 136–145)
SP GR UR STRIP.AUTO: 1.02 (ref 1–1.03)
TRIGL SERPL-MCNC: 177 MG/DL (ref 30–149)
TROPONIN I HIGH SENSITIVITY: 390 NG/L
TROPONIN I HIGH SENSITIVITY: 577 NG/L
UROBILINOGEN UR STRIP.AUTO-MCNC: <2 MG/DL
VLDLC SERPL CALC-MCNC: 34 MG/DL (ref 0–30)
WBC # BLD AUTO: 14.8 X10(3) UL (ref 4–11)

## 2022-10-04 PROCEDURE — 99285 EMERGENCY DEPT VISIT HI MDM: CPT

## 2022-10-04 PROCEDURE — 80061 LIPID PANEL: CPT | Performed by: EMERGENCY MEDICINE

## 2022-10-04 PROCEDURE — 85730 THROMBOPLASTIN TIME PARTIAL: CPT | Performed by: EMERGENCY MEDICINE

## 2022-10-04 PROCEDURE — 83880 ASSAY OF NATRIURETIC PEPTIDE: CPT | Performed by: EMERGENCY MEDICINE

## 2022-10-04 PROCEDURE — 84484 ASSAY OF TROPONIN QUANT: CPT | Performed by: EMERGENCY MEDICINE

## 2022-10-04 PROCEDURE — 80053 COMPREHEN METABOLIC PANEL: CPT | Performed by: EMERGENCY MEDICINE

## 2022-10-04 PROCEDURE — 93306 TTE W/DOPPLER COMPLETE: CPT | Performed by: INTERNAL MEDICINE

## 2022-10-04 PROCEDURE — 71275 CT ANGIOGRAPHY CHEST: CPT | Performed by: EMERGENCY MEDICINE

## 2022-10-04 PROCEDURE — 71045 X-RAY EXAM CHEST 1 VIEW: CPT | Performed by: EMERGENCY MEDICINE

## 2022-10-04 PROCEDURE — 85379 FIBRIN DEGRADATION QUANT: CPT | Performed by: EMERGENCY MEDICINE

## 2022-10-04 PROCEDURE — 81003 URINALYSIS AUTO W/O SCOPE: CPT | Performed by: EMERGENCY MEDICINE

## 2022-10-04 PROCEDURE — 96375 TX/PRO/DX INJ NEW DRUG ADDON: CPT

## 2022-10-04 PROCEDURE — 84145 PROCALCITONIN (PCT): CPT | Performed by: INTERNAL MEDICINE

## 2022-10-04 PROCEDURE — 93010 ELECTROCARDIOGRAM REPORT: CPT

## 2022-10-04 PROCEDURE — 85610 PROTHROMBIN TIME: CPT | Performed by: EMERGENCY MEDICINE

## 2022-10-04 PROCEDURE — 85025 COMPLETE CBC W/AUTO DIFF WBC: CPT | Performed by: EMERGENCY MEDICINE

## 2022-10-04 PROCEDURE — 96365 THER/PROPH/DIAG IV INF INIT: CPT

## 2022-10-04 RX ORDER — MEMANTINE HYDROCHLORIDE 5 MG/1
10 TABLET ORAL 2 TIMES DAILY
Status: DISCONTINUED | OUTPATIENT
Start: 2022-10-04 | End: 2022-10-06

## 2022-10-04 RX ORDER — DIAPER,BRIEF,INFANT-TODD,DISP
EACH MISCELLANEOUS DAILY
COMMUNITY

## 2022-10-04 RX ORDER — FUROSEMIDE 10 MG/ML
40 INJECTION INTRAMUSCULAR; INTRAVENOUS ONCE
Status: COMPLETED | OUTPATIENT
Start: 2022-10-04 | End: 2022-10-04

## 2022-10-04 RX ORDER — HEPARIN SODIUM 1000 [USP'U]/ML
80 INJECTION, SOLUTION INTRAVENOUS; SUBCUTANEOUS ONCE
Status: COMPLETED | OUTPATIENT
Start: 2022-10-04 | End: 2022-10-04

## 2022-10-04 RX ORDER — NYSTATIN 100000 [USP'U]/G
POWDER TOPICAL 3 TIMES DAILY
COMMUNITY
Start: 2022-09-24

## 2022-10-04 RX ORDER — ONDANSETRON 2 MG/ML
4 INJECTION INTRAMUSCULAR; INTRAVENOUS EVERY 6 HOURS PRN
Status: DISCONTINUED | OUTPATIENT
Start: 2022-10-04 | End: 2022-10-06

## 2022-10-04 RX ORDER — MEMANTINE HYDROCHLORIDE 10 MG/1
10 TABLET ORAL 2 TIMES DAILY
COMMUNITY

## 2022-10-04 RX ORDER — HEPARIN SODIUM AND DEXTROSE 10000; 5 [USP'U]/100ML; G/100ML
INJECTION INTRAVENOUS CONTINUOUS
Status: DISCONTINUED | OUTPATIENT
Start: 2022-10-04 | End: 2022-10-05

## 2022-10-04 RX ORDER — HEPARIN SODIUM AND DEXTROSE 10000; 5 [USP'U]/100ML; G/100ML
18 INJECTION INTRAVENOUS ONCE
Status: COMPLETED | OUTPATIENT
Start: 2022-10-04 | End: 2022-10-04

## 2022-10-04 RX ORDER — ACETAMINOPHEN 325 MG/1
650 TABLET ORAL EVERY 6 HOURS PRN
Status: DISCONTINUED | OUTPATIENT
Start: 2022-10-04 | End: 2022-10-06

## 2022-10-04 NOTE — ED QUICK NOTES
Orders for admission, patient is aware of plan and ready to go upstairs.  Any questions, please call ED RN Niko Escobedo at extension [phone]    Patient Covid vaccination status: Fully vaccinated     COVID Test Ordered in ED: Rapid SARS-CoV-2 by PCR    COVID Suspicion at Admission: No risk with negative rapid    Running Infusions:  heparin 1200units/hr    Mental Status/LOC at time of transport: a&ox1    Other pertinent information:   CIWA score: N/A   NIH score:  N/A

## 2022-10-04 NOTE — ED INITIAL ASSESSMENT (HPI)
PT PRESENTS TO ED WITH SHORTNESS OF BREATH, HYPOXIC AT NURSING HOME IN 80'S ON RA, DOES NOT NORMALLY WEAR O2 AT NURSING FACILITY, PT IS A&OX1 AND THAT IS HER BASELINE.   PT IS 88% ON RA UPON ARRIVAL

## 2022-10-05 LAB
ANION GAP SERPL CALC-SCNC: 7 MMOL/L (ref 0–18)
APTT PPP: 85.6 SECONDS (ref 23.3–35.6)
BASOPHILS # BLD AUTO: 0.05 X10(3) UL (ref 0–0.2)
BASOPHILS NFR BLD AUTO: 0.5 %
BUN BLD-MCNC: 19 MG/DL (ref 7–18)
CALCIUM BLD-MCNC: 8.8 MG/DL (ref 8.5–10.1)
CHLORIDE SERPL-SCNC: 103 MMOL/L (ref 98–112)
CO2 SERPL-SCNC: 28 MMOL/L (ref 21–32)
CREAT BLD-MCNC: 0.95 MG/DL
EOSINOPHIL # BLD AUTO: 0.23 X10(3) UL (ref 0–0.7)
EOSINOPHIL NFR BLD AUTO: 2.5 %
ERYTHROCYTE [DISTWIDTH] IN BLOOD BY AUTOMATED COUNT: 14.6 %
GFR SERPLBLD BASED ON 1.73 SQ M-ARVRAT: 57 ML/MIN/1.73M2 (ref 60–?)
GLUCOSE BLD-MCNC: 106 MG/DL (ref 70–99)
HCT VFR BLD AUTO: 36.5 %
HGB BLD-MCNC: 11.7 G/DL
IMM GRANULOCYTES # BLD AUTO: 0.03 X10(3) UL (ref 0–1)
IMM GRANULOCYTES NFR BLD: 0.3 %
LYMPHOCYTES # BLD AUTO: 1.52 X10(3) UL (ref 1–4)
LYMPHOCYTES NFR BLD AUTO: 16.3 %
MCH RBC QN AUTO: 28.9 PG (ref 26–34)
MCHC RBC AUTO-ENTMCNC: 32.1 G/DL (ref 31–37)
MCV RBC AUTO: 90.1 FL
MONOCYTES # BLD AUTO: 1.13 X10(3) UL (ref 0.1–1)
MONOCYTES NFR BLD AUTO: 12.2 %
NEUTROPHILS # BLD AUTO: 6.34 X10 (3) UL (ref 1.5–7.7)
NEUTROPHILS # BLD AUTO: 6.34 X10(3) UL (ref 1.5–7.7)
NEUTROPHILS NFR BLD AUTO: 68.2 %
OSMOLALITY SERPL CALC.SUM OF ELEC: 289 MOSM/KG (ref 275–295)
PLATELET # BLD AUTO: 304 10(3)UL (ref 150–450)
POTASSIUM SERPL-SCNC: 4.1 MMOL/L (ref 3.5–5.1)
RBC # BLD AUTO: 4.05 X10(6)UL
SODIUM SERPL-SCNC: 138 MMOL/L (ref 136–145)
WBC # BLD AUTO: 9.3 X10(3) UL (ref 4–11)

## 2022-10-05 PROCEDURE — 85730 THROMBOPLASTIN TIME PARTIAL: CPT | Performed by: INTERNAL MEDICINE

## 2022-10-05 PROCEDURE — 85025 COMPLETE CBC W/AUTO DIFF WBC: CPT | Performed by: INTERNAL MEDICINE

## 2022-10-05 PROCEDURE — 80048 BASIC METABOLIC PNL TOTAL CA: CPT | Performed by: INTERNAL MEDICINE

## 2022-10-05 RX ORDER — ALPRAZOLAM 0.25 MG/1
0.12 TABLET ORAL 3 TIMES DAILY PRN
Status: ON HOLD | COMMUNITY
End: 2022-10-06

## 2022-10-05 RX ORDER — ASPIRIN 325 MG
325 TABLET ORAL DAILY
COMMUNITY
End: 2022-10-06

## 2022-10-05 RX ORDER — B-COMPLEX WITH VITAMIN C
TABLET ORAL
COMMUNITY

## 2022-10-05 RX ORDER — IBUPROFEN 200 MG
200 TABLET ORAL EVERY 8 HOURS PRN
COMMUNITY
End: 2022-10-06

## 2022-10-05 RX ORDER — ATORVASTATIN CALCIUM 20 MG/1
20 TABLET, FILM COATED ORAL NIGHTLY
COMMUNITY

## 2022-10-05 RX ORDER — MULTIVIT-MIN/IRON FUM/FOLIC AC 7.5 MG-4
1 TABLET ORAL DAILY
COMMUNITY

## 2022-10-05 RX ORDER — ALPRAZOLAM 0.25 MG/1
0.12 TABLET ORAL 3 TIMES DAILY PRN
Status: DISCONTINUED | OUTPATIENT
Start: 2022-10-05 | End: 2022-10-06

## 2022-10-05 RX ORDER — BENZOCAINE 20 %
1 GEL (GRAM) MUCOUS MEMBRANE DAILY
COMMUNITY

## 2022-10-05 RX ORDER — RAMIPRIL 10 MG/1
10 CAPSULE ORAL DAILY
COMMUNITY

## 2022-10-05 RX ORDER — BISOPROLOL FUMARATE AND HYDROCHLOROTHIAZIDE 2.5; 6.25 MG/1; MG/1
1 TABLET ORAL DAILY
COMMUNITY

## 2022-10-05 RX ORDER — ZALEPLON 5 MG/1
5 CAPSULE ORAL NIGHTLY
COMMUNITY

## 2022-10-05 RX ORDER — ATORVASTATIN CALCIUM 20 MG/1
20 TABLET, FILM COATED ORAL NIGHTLY
Status: DISCONTINUED | OUTPATIENT
Start: 2022-10-05 | End: 2022-10-06

## 2022-10-05 NOTE — PLAN OF CARE
Pt more alert today, opening eyes to speech and speaking with family members. Still oriented x1, which is baseline. VSS. NSR on tele. Able to get down to 1L O2 today. Heprin drip discontinue and oral blood thinners started per cardiology. Patient took oral meds well today when mixed with apple sauce. Bed bound at baseline. Skin intact. Q2 turns. Incontinent, purwick in place. Spoke with  who confirms that he and his daughter Davion Sanders and both [place] for patient. Daughter provided a list of medications that were not being given at facility but patient was taking prior to that admission. These meds were added to admission PTA med list and hospitalist notified for review. Bed in lowest position, call light in reach, family and patient updated on plan of care, all questions answered at this time.    Problem: Patient/Family Goals  Goal: Patient/Family Long Term Goal  Description: Patient's Long Term Goal: \"stay out of the hospital\"    Interventions:  - Medications as prescribed  - See additional Care Plan goals for specific interventions  Outcome: Progressing  Goal: Patient/Family Short Term Goal  Description: Patient's Short Term Goal: \"Go back home (to facility)\"    Interventions:   - wean O2 needs  - Cardiology to dose DOAC medications  - See additional Care Plan goals for specific interventions  Outcome: Progressing

## 2022-10-05 NOTE — PLAN OF CARE
Assumed care for pt at 19:30 on 10/4    Nonverbal, resists being cleaned/turned. Lethargic, not following commands to open eyes. Did not give anything PO. Appears comfortable, sleeping. VSS on 2L. NSR with BBB on tele. No LE edema, +2 pedal pulses. Incontinent, purewick in place. Heparin infusing through L AC for PE protocol. Called by lab at 02:30 to confirm 2AM PTT needed- said she was at a rapid response and behind. Total care pt, turn q 2h. Wound care consulted for RLE wound. Edit: lab here attempted to draw pt, pt non cooperative and moving. R Arm precaution limits area to draw blood from. Attempting again when AM lab people are present. Plan: Heparin gtt, wound to see. Bed alarm on, call light in reach. Rounding q 2h.

## 2022-10-05 NOTE — CM/SW NOTE
Chart reviewed for discharge needs. Pt came to hospital from [address] Initial updates sent in A[place].      MALA Anderson, [facility]  Discharge [address]

## 2022-10-05 NOTE — PROGRESS NOTES
NURSING ADMISSION NOTE      Patient admitted via Cart  Oriented to room. Safety precautions initiated. Bed in low position. Call light in reach. Pt arrived to floor lethargic, oriented to self. 3L O2 via nasal canula. Bubbler added. NSR on tele. Vitals stable. Hep gtt infusing. Unable to stand for weight or orthostatic BP. Nonverbal - nods appropriately. Skin check complete with tech, skin clean, dry, intact, one abrasion noted to the right shin. Covered with mepilex. Family updated on plan of care, all questions answered.

## 2022-10-06 VITALS
DIASTOLIC BLOOD PRESSURE: 57 MMHG | HEART RATE: 89 BPM | OXYGEN SATURATION: 92 % | BODY MASS INDEX: 26.5 KG/M2 | TEMPERATURE: 97 F | SYSTOLIC BLOOD PRESSURE: 125 MMHG | WEIGHT: 144 LBS | RESPIRATION RATE: 16 BRPM | HEIGHT: 62 IN

## 2022-10-06 RX ORDER — ACETAMINOPHEN 325 MG/1
650 TABLET ORAL EVERY 6 HOURS PRN
Qty: 30 TABLET | Refills: 0 | Status: SHIPPED | COMMUNITY
Start: 2022-10-06

## 2022-10-06 RX ORDER — ALPRAZOLAM 0.25 MG/1
0.12 TABLET ORAL 3 TIMES DAILY PRN
Qty: 12 TABLET | Refills: 0 | Status: SHIPPED | OUTPATIENT
Start: 2022-10-06

## 2022-10-06 NOTE — CM/SW NOTE
10/06/22 1225   Discharge disposition   Expected discharge disposition [facility] Provider [facility]   Discharge transportation [facility] with RN. Pt cleared for discharge to return to [facility] today. Confirmed w/ Nicole at [facility] that pt can return today. RN updated. BLS set up for 5:00pm. PCS form completed/printed. RN to update pt/family.     Keren Mares [phone]      [facility]: [phone]

## 2022-10-06 NOTE — PLAN OF CARE
Pt denies c/o pain. A&Ox1, oriented to person only. Lungs diminished bilaterally with equal expansion, on 3L O2 NC. Pt NSR on monitor with regular rate. Abdomen soft and non-tender with active bowel sounds. Incontinent of B&B. Wound to RLE, steri strips and optifoam. Pt and family member updated with plan of care.     Problem: Patient/Family Goals  Goal: Patient/Family Long Term Goal  Description: Patient's Long Term Goal: \"stay out of the hospital\"    Interventions:  - Medications as prescribed  - See additional Care Plan goals for specific interventions  Outcome: Progressing  Goal: Patient/Family Short Term Goal  Description: Patient's Short Term Goal: \"Go back home (to facility)\"    Interventions:   - wean O2 needs  - Cardiology to dose DOAC medications  - See additional Care Plan goals for specific interventions  Outcome: Progressing

## 2022-10-06 NOTE — PLAN OF CARE
Assumed care of patient at 1. Pt lethargic, opened eyes and shook head no when prompted with questions. Pt did not talk, KAITLIN orientation. O2 sats maintained on 3L NC. NSR on tele. Last BM 10/3. Connee Gehrig in place for incontinence. Pt shakes her head no when asked about pain. Total assist. Pt updated on plan of care. Care needs met. Bed in lowest position, Call light within reach. Bed alarm on. Pt refusing most of her meds, shaking her head no. Able to have pt swallow 1-2 bites of applesauce with some meds. Pt started taking meds out of her mouth and putting them on blanket. Right shin abrasion with mepilex in place. This morning, pt speaking minimal words, more alert.       POC: wound care to see    Problem: Patient/Family Goals  Goal: Patient/Family Long Term Goal  Description: Patient's Long Term Goal: \"stay out of the hospital\"    Interventions:  - Medications as prescribed  - See additional Care Plan goals for specific interventions  Outcome: Progressing  Goal: Patient/Family Short Term Goal  Description: Patient's Short Term Goal: \"Go back home (to facility)\"    Interventions:   - wean O2 needs  - Cardiology to dose DOAC medications  - See additional Care Plan goals for specific interventions  Outcome: Progressing

## 2022-10-06 NOTE — PLAN OF CARE
This RN called report to [facility] in [facility]. This RN on hold for approx five minutes when  staff asked to take message. This RN gave from desk staff assigned phone number for call back.

## (undated) NOTE — IP AVS SNAPSHOT
[address]            (For Outpatient Use Only) Initial Admit Date: 10/4/2022   Inpt/Obs Admit Date: Inpt: 10/4/22 / Obs: N/A   Discharge Date:    Hospital Acct:  [de-identified]   MRN: [de-identified]   CSN: [id number]   CEID: [id number]        ENCOUNTER  Patient Class: Inpatient Admitting Provider: Hugo Mata DO Unit: [address] [facility] Service: Cardiac Telemetry Attending Provider: Hugo Mata DO   Bed: 1602-A   Visit Type:   Referring Physician: No ref. provider found Billing Flag:    Admit Diagnosis: Elevated troponin [R77.8]      PATIENT  Legal Name:   Pernell Olivas   Legal Sex: Female  Gender ID:              [address] [facility] Name:    PCP:  Husam Menard MD Home: [phone]   Address:  [address] [place] [address] : 1931 (89 yrs) Mobile: [phone]         City/State/Zip: [address], [address] Marital:  Language: [address] [place] [address] Drive: Will SSN4: [id number] Jehovah's witness: Religion - [employer] C*     Race: White Ethnicity: Non  Or  O*   EMERGENCY CONTACT   Name Relationship Legal Guardian? Home Phone Work Phone Mobile Phone   1. Yaneli Qureshi  2.  Nichelle Decent Daughter  Spouse    [phone]     GUARANTOR  Guarantor: Wilberto Chaudhry : 1931 Home Phone: [phone]   Address: [address] [place] [address]  Sex: Female Work Phone:    City/State/Zip: [address] [place] [address], [address]   Rel. to Patient: Self Guarantor ID: [id number]   [address]   Employer:  Status: RETIRED     COVERAGE  PRIMARY INSURANCE   Payor: [facility]*   Group Number: [id number] Insurance Type: INDEMNITY   Subscriber Name: Duane Camejo : 1931   Subscriber ID: [id number] Pt Rel to Subscriber: Self   SECONDARY INSURANCE   Payor:  Plan:    Group Number:  Insurance Type:    Subscriber Name:  Subscriber :    Subscriber ID:  Pt Rel to Subscriber:    TERTIARY INSURANCE   Payor:  Plan:    Group Number:  Insurance Type:    Subscriber Name:  Subscriber :    Subscriber ID:  Pt Rel to Subscriber:    Hospital Account Financial Class: Medicare Advantage    2022

## (undated) NOTE — ED AVS SNAPSHOT
Pancho Kilpatrick   MRN: [id number]    Department:  [facility] Emergency Department   Date of Visit:  5/4/2019           Disclosure     Insurance plans vary and the physician(s) referred by the ER may not be covered by your plan.  Please contact your tell this physician (or your personal doctor if your instructions are to return to your personal doctor) about any new or lasting problems. The primary care or specialist physician will see patients referred from the [facility] Emergency Department.  Elizabeth Austin